# Patient Record
Sex: MALE | Race: BLACK OR AFRICAN AMERICAN | NOT HISPANIC OR LATINO | URBAN - NONMETROPOLITAN AREA
[De-identification: names, ages, dates, MRNs, and addresses within clinical notes are randomized per-mention and may not be internally consistent; named-entity substitution may affect disease eponyms.]

---

## 2019-01-25 ENCOUNTER — IMPORTED ENCOUNTER (OUTPATIENT)
Dept: URBAN - NONMETROPOLITAN AREA CLINIC 1 | Facility: CLINIC | Age: 64
End: 2019-01-25

## 2019-01-25 PROBLEM — H52.4: Noted: 2019-01-25

## 2019-01-25 PROBLEM — H25.13: Noted: 2019-01-25

## 2019-01-25 PROBLEM — H43.812: Noted: 2019-01-25

## 2019-01-25 PROBLEM — H02.834: Noted: 2019-01-25

## 2019-01-25 PROBLEM — H35.412: Noted: 2019-01-25

## 2019-01-25 PROBLEM — H02.831: Noted: 2019-01-25

## 2019-01-25 PROCEDURE — 99213 OFFICE O/P EST LOW 20 MIN: CPT

## 2019-01-25 PROCEDURE — 92250 FUNDUS PHOTOGRAPHY W/I&R: CPT

## 2019-01-25 NOTE — PATIENT DISCUSSION
PVD / Floater OS - Discussed findings of exam in detail with the patient. - The risk of retinal detachment in patients with PVDs was discussed with the patient and the warning signs of retinal detachment were carefully reviewed with the patient. - The patient was warned to return to the office or contact the ophthalmologist on call immediately if they experience signs of retinal detachment. - Optos done today shows OD WNL and OS shows PVD but no signs of retinal holes tears or detachments- Amsler Grid given by Dr. Shirley Ledesma with instructions on how to use.  Patient to call or come into the office ASAP if any changes notd from today  - Continue to monitor [place] OU- Discussed diagnosis in detail with patient- Discussed signs and symptoms of progression- Discussed UV protection- No treatment needed at this time - BAT done today 20/30 OU - Continue to monitorDermatochalasis - Discussed diagnosis in detail with patient- No superior field of vision loss- Continue to monitorPresbyopia OU- Discussed diagnosis in detail with patient - Continue to monitor; Dr's Notes: RM  8/22/18DFE  deferred - 8/22/18Optos 1/25/19

## 2019-08-28 ENCOUNTER — IMPORTED ENCOUNTER (OUTPATIENT)
Dept: URBAN - NONMETROPOLITAN AREA CLINIC 1 | Facility: CLINIC | Age: 64
End: 2019-08-28

## 2019-08-28 PROBLEM — H35.412: Noted: 2019-08-28

## 2019-08-28 PROBLEM — H52.4: Noted: 2019-08-28

## 2019-08-28 PROBLEM — H02.834: Noted: 2019-08-28

## 2019-08-28 PROBLEM — H25.13: Noted: 2019-08-28

## 2019-08-28 PROBLEM — H43.812: Noted: 2019-08-28

## 2019-08-28 PROBLEM — H02.831: Noted: 2019-08-28

## 2019-08-28 PROCEDURE — 92015 DETERMINE REFRACTIVE STATE: CPT

## 2019-08-28 PROCEDURE — 92014 COMPRE OPH EXAM EST PT 1/>: CPT

## 2019-08-28 NOTE — PATIENT DISCUSSION
Presbyopia OU- Discussed diagnosis in detail with patient - New glasses rx given today - Continue to monitor PVD / Floater OS - Discussed findings of exam in detail with the patient. - The risk of retinal detachment in patients with PVDs was discussed with the patient and the warning signs of retinal detachment were carefully reviewed with the patient. - The patient was warned to return to the office or contact the ophthalmologist on call immediately if they experience signs of retinal detachment. - Optos done today shows OD WNL and OS shows PVD but no signs of retinal holes tears or detachments- Amsler Grid given by Dr. Francisco Javier Valle with instructions on how to use.  Patient to call or come into the office ASAP if any changes notd from today  - Continue to monitor [place] OU- Discussed diagnosis in detail with patient- Discussed signs and symptoms of progression- Discussed UV protection- No treatment needed at this time - BAT done previously 20/30 OU - Continue to monitorDermatochalasis - Discussed diagnosis in detail with patient- No superior field of vision loss- Continue to monitor; Dr's Notes: RM  8/28/19DFE  8/28/19Optos 8/28/19

## 2020-08-31 ENCOUNTER — IMPORTED ENCOUNTER (OUTPATIENT)
Dept: URBAN - NONMETROPOLITAN AREA CLINIC 1 | Facility: CLINIC | Age: 65
End: 2020-08-31

## 2020-08-31 PROCEDURE — 92015 DETERMINE REFRACTIVE STATE: CPT

## 2020-08-31 PROCEDURE — 92014 COMPRE OPH EXAM EST PT 1/>: CPT

## 2020-08-31 NOTE — PATIENT DISCUSSION
Presbyopia OU- Discussed diagnosis in detail with patient - New glasses rx given today - Continue to monitor PVD / Floater OS - Discussed findings of exam in detail with the patient. - The risk of retinal detachment in patients with PVDs was discussed with the patient and the warning signs of retinal detachment were carefully reviewed with the patient. - The patient was warned to return to the office or contact the ophthalmologist on call immediately if they experience signs of retinal detachment. - Optos done today shows OD WNL and OS shows PVD but no signs of retinal holes tears or detachments- Amsler Grid given by Dr. Jaspreet Wilhelm with instructions on how to use.  Patient to call or come into the office ASAP if any changes notd from today  - Continue to monitor [place] OU- Discussed diagnosis in detail with patient- Discussed signs and symptoms of progression- Discussed UV protection- No treatment needed at this time - BAT done previously 20/30 OU - Continue to monitorDermatochalasis - Discussed diagnosis in detail with patient- No superior field of vision loss- Continue to monitor; Dr's Notes: RM  8/28/19DFE  8/28/19Optos 8/28/19

## 2021-08-31 ENCOUNTER — IMPORTED ENCOUNTER (OUTPATIENT)
Dept: URBAN - NONMETROPOLITAN AREA CLINIC 1 | Facility: CLINIC | Age: 66
End: 2021-08-31

## 2021-08-31 PROCEDURE — 92015 DETERMINE REFRACTIVE STATE: CPT

## 2021-08-31 PROCEDURE — 92014 COMPRE OPH EXAM EST PT 1/>: CPT

## 2021-08-31 NOTE — PATIENT DISCUSSION
Presbyopia OU- Discussed diagnosis in detail with patient - New glasses rx given today - Continue to monitor PVD / Floater OS - Discussed findings of exam in detail with the patient. - The risk of retinal detachment in patients with PVDs was discussed with the patient and the warning signs of retinal detachment were carefully reviewed with the patient. - The patient was warned to return to the office or contact the ophthalmologist on call immediately if they experience signs of retinal detachment. - Optos done today shows OD WNL and OS shows PVD but no signs of retinal holes tears or detachments- Amsler Grid given by Dr. Emmanuel Liu with instructions on how to use.  Patient to call or come into the office ASAP if any changes notd from today  - Continue to monitor [place] OU- Discussed diagnosis in detail with patient- Discussed signs and symptoms of progression- Discussed UV protection- No treatment needed at this time - BAT done previously 20/30 OU - Continue to monitorDermatochalasis - Discussed diagnosis in detail with patient- No superior field of vision loss- Continue to monitor; Dr's Notes: RM  8/28/19DFE  8/28/19Optos 8/28/19

## 2022-04-09 ASSESSMENT — VISUAL ACUITY
OS_SC: 20/29
OD_SC: 20/25+
OS_GLARE: 20/30
OD_SC: 20/20-
OD_SC: 20/20
OD_GLARE: 20/30
OU_CC: 20/40
OS_SC: 20/25-
OD_SC: 20/20
OS_SC: 20/20-
OS_SC: 20/40+

## 2022-04-09 ASSESSMENT — TONOMETRY
OS_IOP_MMHG: 15
OD_IOP_MMHG: 16
OS_IOP_MMHG: 16
OS_IOP_MMHG: 16
OD_IOP_MMHG: 17
OD_IOP_MMHG: 16
OD_IOP_MMHG: 16
OS_IOP_MMHG: 17

## 2022-09-02 ENCOUNTER — ESTABLISHED PATIENT (OUTPATIENT)
Dept: URBAN - NONMETROPOLITAN AREA CLINIC 1 | Facility: CLINIC | Age: 67
End: 2022-09-02

## 2022-09-02 DIAGNOSIS — H52.4: ICD-10-CM

## 2022-09-02 PROCEDURE — 92014 COMPRE OPH EXAM EST PT 1/>: CPT

## 2022-09-02 PROCEDURE — 92015 DETERMINE REFRACTIVE STATE: CPT

## 2022-09-02 ASSESSMENT — TONOMETRY
OD_IOP_MMHG: 16
OS_IOP_MMHG: 16

## 2022-09-02 ASSESSMENT — VISUAL ACUITY
OS_CC: 20/20-1
OD_BAT: 20/40
OD_CC: 20/30-2
OS_BAT: 20/40

## 2023-09-05 ENCOUNTER — ESTABLISHED PATIENT (OUTPATIENT)
Dept: URBAN - NONMETROPOLITAN AREA CLINIC 1 | Facility: CLINIC | Age: 68
End: 2023-09-05

## 2023-09-05 DIAGNOSIS — H52.4: ICD-10-CM

## 2023-09-05 DIAGNOSIS — H25.13: ICD-10-CM

## 2023-09-05 DIAGNOSIS — H35.412: ICD-10-CM

## 2023-09-05 DIAGNOSIS — H43.812: ICD-10-CM

## 2023-09-05 PROCEDURE — 92015 DETERMINE REFRACTIVE STATE: CPT

## 2023-09-05 PROCEDURE — 92014 COMPRE OPH EXAM EST PT 1/>: CPT

## 2023-09-05 ASSESSMENT — VISUAL ACUITY
OS_CC: 20/40
OS_BAT: 20/40
OS_PH: 20/30
OU_CC: 20/25-2
OD_BAT: 20/40
OD_CC: 20/30-1

## 2023-09-05 ASSESSMENT — TONOMETRY
OS_IOP_MMHG: 16
OD_IOP_MMHG: 16

## 2024-09-11 ENCOUNTER — COMPREHENSIVE EXAM (OUTPATIENT)
Dept: URBAN - NONMETROPOLITAN AREA CLINIC 1 | Facility: CLINIC | Age: 69
End: 2024-09-11

## 2024-09-11 DIAGNOSIS — H52.4: ICD-10-CM

## 2024-09-11 PROCEDURE — 92014 COMPRE OPH EXAM EST PT 1/>: CPT

## 2024-09-11 PROCEDURE — 92015 DETERMINE REFRACTIVE STATE: CPT
